# Patient Record
Sex: FEMALE | Race: OTHER | Employment: UNEMPLOYED | URBAN - METROPOLITAN AREA
[De-identification: names, ages, dates, MRNs, and addresses within clinical notes are randomized per-mention and may not be internally consistent; named-entity substitution may affect disease eponyms.]

---

## 2019-07-29 PROCEDURE — 88305 TISSUE EXAM BY PATHOLOGIST: CPT

## 2019-07-30 ENCOUNTER — HOSPITAL ENCOUNTER (OUTPATIENT)
Dept: LAB | Age: 54
Discharge: HOME | End: 2019-07-30

## 2021-06-28 ENCOUNTER — TRANSCRIBE ORDER (OUTPATIENT)
Dept: SCHEDULING | Age: 56
End: 2021-06-28

## 2021-06-28 DIAGNOSIS — Z12.31 SCREENING MAMMOGRAM FOR HIGH-RISK PATIENT: Primary | ICD-10-CM

## 2022-12-13 ENCOUNTER — OFFICE VISIT (OUTPATIENT)
Dept: ORTHOPEDIC SURGERY | Age: 57
End: 2022-12-13

## 2022-12-13 VITALS — BODY MASS INDEX: 33.32 KG/M2 | HEIGHT: 65 IN | WEIGHT: 200 LBS

## 2022-12-13 DIAGNOSIS — M25.561 ACUTE PAIN OF RIGHT KNEE: Primary | ICD-10-CM

## 2022-12-13 PROBLEM — M54.12 CERVICAL RADICULOPATHY: Status: ACTIVE | Noted: 2018-10-23

## 2022-12-13 PROBLEM — M65.341 ACQUIRED TRIGGER FINGER OF RIGHT RING FINGER: Status: ACTIVE | Noted: 2021-01-19

## 2022-12-13 RX ORDER — CYCLOBENZAPRINE HCL 10 MG
TABLET ORAL
COMMUNITY
Start: 2022-10-21

## 2022-12-13 NOTE — PROGRESS NOTES
Name: Chica Viramontes  YOB: 1965  Gender: female  MRN: [id number]      CC: Knee Pain (Right knee)       HPI: Chica Viramontes is a 59 y.o. female who presents with Knee Pain (Right knee)  Ms. Adair Corona is a new patient here today with right knee pain for past 4 or 5 weeks. She was reportedly having some mild posterior right knee pain prior to her injury. At the time of injury about 4 weeks ago she [place] the beach and had a pop in the knee and felt like it gave way. She did not fall, but she was evaluated in the ER due to her pain. Since the injury she has been taking over the counter NSAIDs and Tylenol with some mild improvement. She reports an injury 30+ years ago to the right knee that included one of her ligaments but she cannot remember which one specifically. She was treated nonoperatively for this injury. She reports that she has pain with prolonged walking and stairs. ROS/Meds/PSH/PMH/FH/SH: I personally reviewed the patients standard intake form. Below are the pertinents    Tobacco:  reports that she has quit smoking. Her smoking use included cigarettes and e-cigarettes. She does not have any smokeless tobacco history on file. Diabetes: none  Other: Cervical radiculopathy    Physical Examination:  General: no acute distress  Lungs: breathing easily  CV: regular rhythm by pulse  Right Knee: Minimal effusion present. Tenderness to palpation both the medial and lateral joint line. Full active and passive range of motion with pain in the extreme of extension. Negative ligamentous exam x4. Positive Jelena's with reproduction of patient's symptoms the medial joint line. Positive bounce home test.      Imaging:   I reviewed 4 nonweightbearing views of the knee performed on 11/12/2022 from an outside source scanned into our system which shows no acute fracture, dislocation or advanced degenerative changes    All imaging interpreted by myself Bernardo Morris MD independent of radiology review        Assessment:     ICD-10-CM    1. Acute pain of right knee  M25.561 MRI KNEE RIGHT WO CONTRAST          Plan:   The majority the patient's right knee pain is due to a meniscal pathology. I discussed with the patient and surgical treatment options to include corticosteroid injection and formal physical therapy versus advanced imaging. I think due to the complaints of mechanical symptoms of popping and giving way in conjunction with clinical exam it is reasonable to evaluate this further with an MRI. She will return after MRI for definitive treatment. Wanda Kohler NP dictating as a scribe for Miguel Ángel Barrientos MD      [facility]  Vi Hurtado MD, [address] [facility] and Sports Medicine

## 2022-12-29 ENCOUNTER — OFFICE VISIT (OUTPATIENT)
Dept: ORTHOPEDIC SURGERY | Age: 57
End: 2022-12-29

## 2022-12-29 DIAGNOSIS — S83.241D ACUTE MEDIAL MENISCUS TEAR OF RIGHT KNEE, SUBSEQUENT ENCOUNTER: Primary | ICD-10-CM

## 2022-12-29 DIAGNOSIS — M25.561 ACUTE PAIN OF RIGHT KNEE: ICD-10-CM

## 2022-12-29 NOTE — PROGRESS NOTES
Name: Sean Wood  YOB: 1965  Gender: female  MRN: [id number]      CC: Follow-up (R knee MRI f/u)       HPI: Sean Wood is a 59 y.o. female who returns for follow up and MRI results on right knee pain. She reports continued right knee pain which is diffuse throughout the knee and continued difficulty with stairs. Physical Examination:  General: no acute distress  Lungs: breathing easily  CV: regular rhythm by pulse  Right Knee: Moderate effusion present. No tenderness to palpation. Full active and passive range of motion with pain in the extremes of extension. Negative ligamentous exam x4. Positive Jelena's with reproduction of patient's symptoms medial joint line. Positive bounce home test.    Imaging:   Reviewed an MRI performed in our system on 12/28/2022 which shows complete radial tear of the medial meniscus in the posterior horn with meniscal extrusion. Mild tricompartmental osteoarthritic chondral loss. Small joint effusion and Baker's cyst.    All imaging interpreted by myself Bernardo Siddiqui MD independent of radiology review    Assessment:     ICD-10-CM    1. Acute medial meniscus tear of right knee, subsequent encounter  S83.241D Ambulatory referral to Orthopedic Surgery      2. Acute pain of right knee  M25.561 Ambulatory referral to Orthopedic Surgery           Plan:   I reviewed MRI and discussed with the patient that she has a meniscal tear which is consistent with clinical exam and patient's complaints. Discussed with the patient continued conservative treatment options to include corticosteroid injection versus surgical intervention. She wishes to have her knee pain addressed definitively which I think is reasonable. We discussed the details risks and benefits of knee arthroscopy with meniscal debridement versus repair including but not limited to anesthetic complications bleeding infection postoperative DVT/PE continued pain further progression of degenerative changes and incomplete resolution of symptoms as well as the possible need for further surgery in the rehab course and recovery period that is expected. Patient elects to proceed as planned after all of their questions have been answered. Surgical plan will be for knee arthroscopy meniscus repair versus debridement    Obie Walker NP dictating as a scribe for MD Moris Edmonds MD, [address] [place] [address] and Sports Medicine